# Patient Record
Sex: FEMALE | Race: BLACK OR AFRICAN AMERICAN | NOT HISPANIC OR LATINO | Employment: UNEMPLOYED | URBAN - METROPOLITAN AREA
[De-identification: names, ages, dates, MRNs, and addresses within clinical notes are randomized per-mention and may not be internally consistent; named-entity substitution may affect disease eponyms.]

---

## 2019-05-14 ENCOUNTER — HOSPITAL ENCOUNTER (EMERGENCY)
Facility: CLINIC | Age: 29
Discharge: HOME | End: 2019-05-14
Attending: EMERGENCY MEDICINE | Admitting: EMERGENCY MEDICINE
Payer: COMMERCIAL

## 2019-05-14 VITALS
HEART RATE: 91 BPM | TEMPERATURE: 98.6 F | HEIGHT: 64 IN | BODY MASS INDEX: 21.51 KG/M2 | WEIGHT: 126 LBS | RESPIRATION RATE: 18 BRPM | OXYGEN SATURATION: 99 % | SYSTOLIC BLOOD PRESSURE: 109 MMHG | DIASTOLIC BLOOD PRESSURE: 59 MMHG

## 2019-05-14 DIAGNOSIS — I49.3 PVC'S (PREMATURE VENTRICULAR CONTRACTIONS): ICD-10-CM

## 2019-05-14 PROCEDURE — 99283 EMERGENCY DEPT VISIT LOW MDM: CPT

## 2019-05-14 PROCEDURE — 93005 ELECTROCARDIOGRAM TRACING: CPT

## 2019-05-14 RX ORDER — METOPROLOL TARTRATE 25 MG/1
25 TABLET, FILM COATED ORAL 2 TIMES DAILY
Qty: 60 TABLET | Refills: 0 | Status: SHIPPED | OUTPATIENT
Start: 2019-05-14 | End: 2022-07-07

## 2019-05-14 ASSESSMENT — MIFFLIN-ST. JEOR: SCORE: 1281.53

## 2019-05-14 ASSESSMENT — ENCOUNTER SYMPTOMS
PALPITATIONS: 1
SHORTNESS OF BREATH: 0
COUGH: 1

## 2019-05-14 NOTE — ED AVS SNAPSHOT
Emergency Department  [address] [place] [address]  Phone:  [phone]  Fax:  [phone]                                    Laurie De Paz   MRN: [id number]    Department:   Emergency Department   Date of Visit:  5/14/2019           After Visit Summary Signature Page    I have received my discharge instructions, and my questions have been answered. I have discussed any challenges I see with this plan with the nurse or doctor.    ..........................................................................................................................................  Patient/Patient Representative Signature      ..........................................................................................................................................  Patient Representative Print Name and Relationship to Patient    ..................................................               ................................................  Date                                   Time    ..........................................................................................................................................  Reviewed by Signature/Title    ...................................................              ..............................................  Date                                               Time          22EPIC Rev 08/18

## 2019-05-15 NOTE — ED TRIAGE NOTES
"Feeling heart beats are irregular, feeling racing heart beats comes and goes with \"heart stopping\" sensation for one month,  Been worsening since yesterday. Coughing after heart racing stops.   "

## 2019-05-15 NOTE — ED PROVIDER NOTES
"  History   Chief Complaint:  Irregular heart beat    HPI   Laurie De Paz is a 29 year old female, who presents to the ED for evaluation of irregular heart beat. The patient reports feeling as if her heart will skip or pause and then resolve shortly after starting last night. She notes that she was fine all day today, but at 1600 she states she has been having consistent pauses or skips and has happened more than 10 times. The patient states the irregular beat is worse when she is sitting or lying down. The patient does state she drinks coffee, but has not had an excessive amount in the recent past. The patient denies any chest pain, leg swelling, shortness of breath, or any other acute symptoms or injuries.     CARDIAC RISK FACTORS:  Sex:    Female  Tobacco:   Yes  Hypertension:   No  Hyperlipidemia:  No  Diabetes:   No  Family History:  No    PE/DVT RISK FACTORS:  Sex:    Female  Hormones:   No  Tobacco:   Yes  Cancer:   No  Travel:   No  Surgery:   No  Other immobilization: No  Personal history:  No  Family history:  No    Allergies:  No known drug allergies    Medications:    The patient is not currently taking any prescribed medications.    Past Medical History:    History reviewed.  No pertinent past medical history.    Past Surgical History:    GYN surgery    Family History:    History reviewed. No pertinent family history.     Social History:  Smoking status: Yes  Alcohol use: Yes  Drug: No  Marital Status:  Single [1]    Review of Systems   Respiratory: Positive for cough. Negative for shortness of breath.    Cardiovascular: Positive for palpitations. Negative for chest pain and leg swelling.   All other systems reviewed and are negative.      Physical Exam     Patient Vitals for the past 24 hrs:   BP Temp Temp src Pulse Resp SpO2 Height Weight   05/14/19 1932 109/59 98.6  F (37  C) Oral 91 18 99 % 1.626 m (5' 4\") 57.2 kg (126 lb)     Physical Exam  Eye:  Pupils are equal, round, and reactive.  " Extraocular movements intact.    ENT:  No rhinorrhea.  Moist mucus membranes.  Normal tongue and tonsil.    Cardiac:  Regular rate and rhythm.  No murmurs, gallops, or rubs.    Pulmonary:  Clear to auscultation bilaterally.  No wheezes, rales, or rhonchi.    Abdomen:  Positive bowel sounds.  Abdomen is soft and non-distended, without focal tenderness.    Musculoskeletal:  Normal movement of all extremities without evidence for deficit. No lower extremity edema or asymmetry.     Skin:  Warm and dry without rashes.    Neurologic:  Non-focal exam without asymmetric weakness or numbness.     Psychiatric:  Normal affect with appropriate interaction with examiner.    Emergency Department Course   ECG (19:30:40):  Rate 90 bpm. PA interval 132. QRS duration 92. QT/QTc 366/447. P-R-T axes 73 56 51. Normal sinus rhythm with sinus arrhythmia. Normal ECG Interpreted at 1950 by Trierweiler, Chad A, MD.    Emergency Department Course:  Past medical records, nursing notes, and vitals reviewed.  1950: I performed an exam of the patient and obtained history, as documented above.  IV inserted and blood drawn.    Findings and plan explained to the Patient. Patient discharged home with instructions regarding supportive care, medications, and reasons to return. The importance of close follow-up was reviewed.     Impression & Plan    Medical Decision Making:  This delightful and healthy 29-year-old presents to us with ongoing issues of having intermittent palpitations in her chest.  She has been evaluated for this in the past with no real serious pathology found.  However, symptoms seem to be worse over the last couple of days.  She denies any significant pain, but notes that she feels pauses and skips in her chest that have occurred at least 10 times today.  This typically occurs when she is at rest or lying down.  Denies any difficulty with exertion.    Patient's exam is unremarkable.  During my conversations with her, she is on the  monitor and shows PVCs that corresponded with her having the symptoms.  She is undergone thorough work-up for this in the past.  She appears well-hydrated and notes that she has been eating and drinking normally.  I do not believe that she would require a laboratory investigation at this time considering the chronicity of this issue and prior work-ups.    We did discuss simply learning to live with these issues versus starting medications.  She prefers a trial of metoprolol for the next month which I feel is reasonable.  I gave her information to follow-up with her primary doctor and with cardiology.  Otherwise, she was invited back to our facility immediately for any constant discomfort, syncope, or any other emergent concerns.    Diagnosis:    ICD-10-CM    1. PVC's (premature ventricular contractions) I49.3      Disposition:  Discharged to home.    Discharge Medications:     Medication List      Started    metoprolol tartrate 25 MG tablet  Commonly known as:  LOPRESSOR  25 mg, Oral, 2 TIMES DAILY          Angel Verdguo  5/14/2019    EMERGENCY DEPARTMENT  Scribe Disclosure:  I, Angel Verdugo, am serving as a scribe at 7:50 PM on 5/14/2019 to document services personally performed by Trierweiler, Chad A, MD based on my observations and the provider's statements to me.       Trierweiler, Chad A, MD  05/16/19 9840

## 2020-01-26 ENCOUNTER — HOSPITAL ENCOUNTER (EMERGENCY)
Facility: CLINIC | Age: 30
Discharge: HOME | End: 2020-01-26
Attending: PHYSICIAN ASSISTANT | Admitting: PHYSICIAN ASSISTANT
Payer: COMMERCIAL

## 2020-01-26 ENCOUNTER — APPOINTMENT (OUTPATIENT)
Dept: GENERAL RADIOLOGY | Facility: CLINIC | Age: 30
End: 2020-01-26
Attending: EMERGENCY MEDICINE
Payer: COMMERCIAL

## 2020-01-26 VITALS
TEMPERATURE: 98.1 F | DIASTOLIC BLOOD PRESSURE: 59 MMHG | RESPIRATION RATE: 16 BRPM | WEIGHT: 176 LBS | SYSTOLIC BLOOD PRESSURE: 105 MMHG | HEART RATE: 80 BPM | BODY MASS INDEX: 30.05 KG/M2 | HEIGHT: 64 IN | OXYGEN SATURATION: 98 %

## 2020-01-26 DIAGNOSIS — S99.921A FOOT INJURY, RIGHT, INITIAL ENCOUNTER: ICD-10-CM

## 2020-01-26 PROCEDURE — 73630 X-RAY EXAM OF FOOT: CPT | Mod: RT

## 2020-01-26 PROCEDURE — 99284 EMERGENCY DEPT VISIT MOD MDM: CPT

## 2020-01-26 ASSESSMENT — ENCOUNTER SYMPTOMS
BACK PAIN: 0
ARTHRALGIAS: 0
NUMBNESS: 0
NECK PAIN: 0

## 2020-01-26 ASSESSMENT — MIFFLIN-ST. JEOR: SCORE: 1503.33

## 2020-01-26 NOTE — ED AVS SNAPSHOT
Emergency Department  [address] [facility] [address]  Phone:  [phone]  Fax:  [phone]                                    Laurie De Paz   MRN: [id number]    Department:   Emergency Department   Date of Visit:  1/26/2020           After Visit Summary Signature Page    I have received my discharge instructions, and my questions have been answered. I have discussed any challenges I see with this plan with the nurse or doctor.    ..........................................................................................................................................  Patient/Patient Representative Signature      ..........................................................................................................................................  Patient Representative Print Name and Relationship to Patient    ..................................................               ................................................  Date                                   Time    ..........................................................................................................................................  Reviewed by Signature/Title    ...................................................              ..............................................  Date                                               Time          22EPIC Rev 08/18

## 2020-01-27 NOTE — ED TRIAGE NOTES
Patient slipped and fell yesterday injuring her right foot. Top of right foot swollen and painful. Did not take anything for pain today.

## 2020-01-27 NOTE — ED PROVIDER NOTES
"  History   Chief Complaint:  Fall and Foot Pain    HPI   Laurie De Paz is a 30 year old female, who presents to the ED for evaluation after a mechanical fall and foot pain. The patient reports she was getting out of the car yesterday when she slipped on the ice and rolled her foot in a hyper plantar flexion mechanism causing her to fall. She denies hitting her head or sustaining any other injury. She notes her right foot is swollen and has the most pain on the top aspect of the foot. The patient denies any ankle pain. She has been able to ambulate since the event, but has difficulty putting weight on the foot. The patient has been taking Tylenol, but has not been able to relieve her pain. She denies numbness or tingling.    Allergies:  No known drug allergies    Medications:    The patient is not currently taking any prescribed medications.    Past Medical History:    History reviewed.  No pertinent past medical history.    Past Surgical History:    GYN surgery    Family History:    History reviewed. No pertinent family history.    Social History:  Smoking status: Yes  Alcohol use: Yes  Drug use: No  PCP: [facility]  Marital Status:  Single [1]    Review of Systems   Musculoskeletal: Negative for arthralgias, back pain and neck pain.        Foot pain   Neurological: Negative for numbness.   All other systems reviewed and are negative.    Physical Exam     Patient Vitals for the past 24 hrs:   BP Temp Temp src Pulse Heart Rate Resp SpO2 Height Weight   01/26/20 2053 105/59 98.1  F (36.7  C) Oral 80 80 16 98 % 1.626 m (5' 4\") 79.8 kg (176 lb)     Physical Exam  General: Resting comfortably.  Alert and oriented.   Head:  The scalp, face, and head appear normal   Eyes:  Conjunctivae and sclerae are normal    CV:  DP and PT pulses intact to right foot    Capillary refill is brisk in all digits of the right foot.   Resp:  No tachypnea. No respiratory distress    Normal effort  MS:  Tenderness to " the dorsal/proximal/lateral aspect of the right root. No ankle tenderness. ROM of the ankle is normal. Patient can wiggle toes without difficulty. No proximal fibular tenderness. Knee ROM is normal. Achilles is clinically intact.   Skin:  No ecchymosis. No obvious deformity. Swelling to the dorsal aspect of the right foot.  No lacerations or abrasions.  Neuro: Sensation is intact throughout right foot.     Emergency Department Course   Imaging:  Radiographic findings were communicated with the patient who voiced understanding of the findings.    XR Foot, 3 views, Right:  Normal joint spaces and alignment. No fracture.    Imaging independently reviewed and agree with radiologist interpretation.       Emergency Department Course:  Past medical records, nursing notes, and vitals reviewed.  2114: I performed an exam of the patient and obtained history, as documented above.  The patient was sent for a foot x-ray while in the emergency department, findings above.    Findings and plan explained to the Patient. Patient discharged home with instructions regarding supportive care, medications, and reasons to return. The importance of close follow-up was reviewed.     Impression & Plan    Medical Decision Making:  Laurie De Paz is a 30 year old female who presented for a right foot injury after slipping on ice as detailed as noted in there HPI. Concerned due to discomfort she presented for evaluation. CMS is intact. Xray was obtained and was negative with no evidence for fracture, dislocation, or malalignment. Given negative xray, this appears to be a simple contusion/sprain. I discussed the slight possibility of occult fracture not identified today and she expresses understanding. There is no evidence of neurovascular compromise. Overall, I think she is safe to be discharged home. Discussed use of ibuprofen/Tylenol and ice and elevation. she will follow up with PCP in 5-7 days for recheck. she will return immediately for  numbness, weakness, tingling, discoloration, or for other concerns. All questions were answered prior to discharge. The patient understands and agrees to this plan.    Diagnosis:    ICD-10-CM    1. Foot injury, right, initial encounter S99.921A      Disposition:  Discharged to home.    Angel Verdugo  1/26/2020    EMERGENCY DEPARTMENT  Scribe Disclosure:  I, Angel Verdugo, am serving as a scribe at 9:14 PM on 1/26/2020 to document services personally performed by Chanel Ibarra PA based on my observations and the provider's statements to me.       Chanel Ibarra PA-C  01/26/20 0665

## 2022-07-07 ENCOUNTER — OFFICE VISIT (OUTPATIENT)
Dept: URGENT CARE | Facility: URGENT CARE | Age: 32
End: 2022-07-07
Payer: COMMERCIAL

## 2022-07-07 VITALS
OXYGEN SATURATION: 98 % | RESPIRATION RATE: 21 BRPM | SYSTOLIC BLOOD PRESSURE: 106 MMHG | DIASTOLIC BLOOD PRESSURE: 73 MMHG | HEART RATE: 81 BPM | TEMPERATURE: 98.9 F

## 2022-07-07 DIAGNOSIS — Z77.21 ACCIDENTAL HYPODERMIC NEEDLESTICK INJURY WITH EXPOSURE TO BODY FLUID: Primary | ICD-10-CM
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
## 2022-07-07 PROCEDURE — 87389 HIV-1 AG W/HIV-1&-2 AB AG IA: CPT | Performed by: NURSE PRACTITIONER

## 2022-07-07 PROCEDURE — 36415 COLL VENOUS BLD VENIPUNCTURE: CPT | Performed by: NURSE PRACTITIONER

## 2022-07-07 PROCEDURE — 86706 HEP B SURFACE ANTIBODY: CPT | Performed by: NURSE PRACTITIONER

## 2022-07-07 PROCEDURE — 87340 HEPATITIS B SURFACE AG IA: CPT | Performed by: NURSE PRACTITIONER

## 2022-07-07 PROCEDURE — 99204 OFFICE O/P NEW MOD 45 MIN: CPT | Performed by: NURSE PRACTITIONER

## 2022-07-07 PROCEDURE — 86803 HEPATITIS C AB TEST: CPT | Performed by: NURSE PRACTITIONER

## 2022-07-07 RX ORDER — LETROZOLE 2.5 MG/1
2.5 TABLET, FILM COATED ORAL
COMMUNITY
Start: 2022-04-29 | End: 2023-04-29

## 2022-07-07 RX ORDER — CYCLOBENZAPRINE HCL 10 MG
TABLET ORAL
COMMUNITY
Start: 2022-04-19 | End: 2022-10-22

## 2022-07-07 NOTE — PROGRESS NOTES
Chief Complaint   Patient presents with     Urgent Care     Present for blood work. Reports she stuck herself with a client used needle today.          ICD-10-CM    1. Accidental hypodermic needlestick injury with exposure to body fluid  Z77.21     W46.0XXA    Await results of blood testing.  Counseled regarding likelihood of transmission and guidelines for future testing.  She understands she needs to follow-up with infectious disease immediately if tests are positive for consideration of prep.      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  Incident occurred earlier today.  Area was washed immediately with soap and water.      Objective    /73 (BP Location: Left arm, Patient Position: Sitting, Cuff Size: Adult Regular)   Pulse 81   Temp 98.9  F (37.2  C) (Tympanic)   Resp 21   SpO2 98%   Nurses notes and VS have been reviewed.    Physical Exam       GENERAL APPEARANCE: healthy appearing, alert     MS: extremities normal- no gross deformities noted; normal muscle tone.     SKIN: no suspicious lesions or rashes     NEURO: Normal strength and tone, mentation intact and speech normal     PSYCH: normal thought process; no significant mood disturbance    Patient Instructions   If any of Michela's tests come back positive get in to see infectious disease doctor as soon as possible.      CRISTÓBAL Godfrey, [facility] Urgent Care Provider    The use of Dragon/Offerboard dictation services may have been used to construct the content in this note; any grammatical or spelling errors are non-intentional. Please contact the author of this note directly if you are in need of any clarification.

## 2022-07-07 NOTE — PATIENT INSTRUCTIONS
If any of Michela's tests come back positive get in to see infectious disease doctor as soon as possible.

## 2022-07-08 LAB
HBV SURFACE AB SERPL IA-ACNC: 85.84 M[IU]/ML
HBV SURFACE AG SERPL QL IA: NONREACTIVE
HCV AB SERPL QL IA: NONREACTIVE
HIV 1+2 AB+HIV1 P24 AG SERPL QL IA: NONREACTIVE

## 2022-10-21 PROCEDURE — 99285 EMERGENCY DEPT VISIT HI MDM: CPT | Mod: 25

## 2022-10-22 ENCOUNTER — APPOINTMENT (OUTPATIENT)
Dept: CT IMAGING | Facility: CLINIC | Age: 32
End: 2022-10-22
Attending: EMERGENCY MEDICINE
Payer: COMMERCIAL

## 2022-10-22 ENCOUNTER — HOSPITAL ENCOUNTER (EMERGENCY)
Facility: CLINIC | Age: 32
Discharge: HOME | End: 2022-10-22
Attending: EMERGENCY MEDICINE | Admitting: EMERGENCY MEDICINE
Payer: COMMERCIAL

## 2022-10-22 ENCOUNTER — APPOINTMENT (OUTPATIENT)
Dept: ULTRASOUND IMAGING | Facility: CLINIC | Age: 32
End: 2022-10-22
Attending: EMERGENCY MEDICINE
Payer: COMMERCIAL

## 2022-10-22 VITALS
HEART RATE: 87 BPM | DIASTOLIC BLOOD PRESSURE: 67 MMHG | OXYGEN SATURATION: 97 % | TEMPERATURE: 98.4 F | SYSTOLIC BLOOD PRESSURE: 102 MMHG | RESPIRATION RATE: 16 BRPM

## 2022-10-22 DIAGNOSIS — R10.9 RIGHT FLANK PAIN: ICD-10-CM

## 2022-10-22 DIAGNOSIS — K82.4 GALLBLADDER POLYP: ICD-10-CM

## 2022-10-22 LAB
ALBUMIN SERPL-MCNC: 3.4 G/DL (ref 3.4–5)
ALBUMIN UR-MCNC: NEGATIVE MG/DL
ALP SERPL-CCNC: 73 U/L (ref 40–150)
ALT SERPL W P-5'-P-CCNC: 18 U/L (ref 0–50)
ANION GAP SERPL CALCULATED.3IONS-SCNC: 8 MMOL/L (ref 3–14)
APPEARANCE UR: ABNORMAL
AST SERPL W P-5'-P-CCNC: 18 U/L (ref 0–45)
BACTERIA #/AREA URNS HPF: ABNORMAL /HPF
BASOPHILS # BLD AUTO: 0 10E3/UL (ref 0–0.2)
BASOPHILS NFR BLD AUTO: 0 %
BILIRUB DIRECT SERPL-MCNC: 0.1 MG/DL (ref 0–0.2)
BILIRUB SERPL-MCNC: 0.5 MG/DL (ref 0.2–1.3)
BILIRUB UR QL STRIP: NEGATIVE
BUN SERPL-MCNC: 8 MG/DL (ref 7–30)
CALCIUM SERPL-MCNC: 9 MG/DL (ref 8.5–10.1)
CHLORIDE BLD-SCNC: 105 MMOL/L (ref 94–109)
CO2 SERPL-SCNC: 25 MMOL/L (ref 20–32)
COLOR UR AUTO: ABNORMAL
CREAT SERPL-MCNC: 0.45 MG/DL (ref 0.52–1.04)
EOSINOPHIL # BLD AUTO: 0.2 10E3/UL (ref 0–0.7)
EOSINOPHIL NFR BLD AUTO: 2 %
ERYTHROCYTE [DISTWIDTH] IN BLOOD BY AUTOMATED COUNT: 12.6 % (ref 10–15)
GFR SERPL CREATININE-BSD FRML MDRD: >90 ML/MIN/1.73M2
GLUCOSE BLD-MCNC: 88 MG/DL (ref 70–99)
GLUCOSE UR STRIP-MCNC: NEGATIVE MG/DL
HCG UR QL: NEGATIVE
HCT VFR BLD AUTO: 41.9 % (ref 35–47)
HGB BLD-MCNC: 14.1 G/DL (ref 11.7–15.7)
HGB UR QL STRIP: ABNORMAL
HOLD SPECIMEN: NORMAL
HOLD SPECIMEN: NORMAL
IMM GRANULOCYTES # BLD: 0.1 10E3/UL
IMM GRANULOCYTES NFR BLD: 1 %
KETONES UR STRIP-MCNC: NEGATIVE MG/DL
LEUKOCYTE ESTERASE UR QL STRIP: ABNORMAL
LIPASE SERPL-CCNC: 139 U/L (ref 73–393)
LYMPHOCYTES # BLD AUTO: 2.8 10E3/UL (ref 0.8–5.3)
LYMPHOCYTES NFR BLD AUTO: 24 %
MCH RBC QN AUTO: 29.8 PG (ref 26.5–33)
MCHC RBC AUTO-ENTMCNC: 33.7 G/DL (ref 31.5–36.5)
MCV RBC AUTO: 89 FL (ref 78–100)
MONOCYTES # BLD AUTO: 0.7 10E3/UL (ref 0–1.3)
MONOCYTES NFR BLD AUTO: 6 %
MUCOUS THREADS #/AREA URNS LPF: PRESENT /LPF
NEUTROPHILS # BLD AUTO: 8.1 10E3/UL (ref 1.6–8.3)
NEUTROPHILS NFR BLD AUTO: 67 %
NITRATE UR QL: NEGATIVE
NRBC # BLD AUTO: 0 10E3/UL
NRBC BLD AUTO-RTO: 0 /100
PH UR STRIP: 5.5 [PH] (ref 5–7)
PLATELET # BLD AUTO: 243 10E3/UL (ref 150–450)
POTASSIUM BLD-SCNC: 3.6 MMOL/L (ref 3.4–5.3)
PROT SERPL-MCNC: 7.5 G/DL (ref 6.8–8.8)
RBC # BLD AUTO: 4.73 10E6/UL (ref 3.8–5.2)
RBC URINE: 2 /HPF
SODIUM SERPL-SCNC: 138 MMOL/L (ref 133–144)
SP GR UR STRIP: 1.02 (ref 1–1.03)
SQUAMOUS EPITHELIAL: 3 /HPF
UROBILINOGEN UR STRIP-MCNC: NORMAL MG/DL
WBC # BLD AUTO: 11.9 10E3/UL (ref 4–11)
WBC URINE: 4 /HPF

## 2022-10-22 PROCEDURE — 36415 COLL VENOUS BLD VENIPUNCTURE: CPT | Performed by: EMERGENCY MEDICINE

## 2022-10-22 PROCEDURE — 96375 TX/PRO/DX INJ NEW DRUG ADDON: CPT

## 2022-10-22 PROCEDURE — 96361 HYDRATE IV INFUSION ADD-ON: CPT

## 2022-10-22 PROCEDURE — 96374 THER/PROPH/DIAG INJ IV PUSH: CPT | Mod: 59

## 2022-10-22 PROCEDURE — 250N000011 HC RX IP 250 OP 636: Performed by: EMERGENCY MEDICINE

## 2022-10-22 PROCEDURE — 87086 URINE CULTURE/COLONY COUNT: CPT | Performed by: EMERGENCY MEDICINE

## 2022-10-22 PROCEDURE — 74177 CT ABD & PELVIS W/CONTRAST: CPT

## 2022-10-22 PROCEDURE — 83690 ASSAY OF LIPASE: CPT | Performed by: EMERGENCY MEDICINE

## 2022-10-22 PROCEDURE — 258N000003 HC RX IP 258 OP 636: Performed by: EMERGENCY MEDICINE

## 2022-10-22 PROCEDURE — 85025 COMPLETE CBC W/AUTO DIFF WBC: CPT | Performed by: EMERGENCY MEDICINE

## 2022-10-22 PROCEDURE — 81001 URINALYSIS AUTO W/SCOPE: CPT | Performed by: EMERGENCY MEDICINE

## 2022-10-22 PROCEDURE — 82310 ASSAY OF CALCIUM: CPT | Performed by: EMERGENCY MEDICINE

## 2022-10-22 PROCEDURE — 82248 BILIRUBIN DIRECT: CPT | Performed by: EMERGENCY MEDICINE

## 2022-10-22 PROCEDURE — 76830 TRANSVAGINAL US NON-OB: CPT

## 2022-10-22 PROCEDURE — 80048 BASIC METABOLIC PNL TOTAL CA: CPT | Performed by: EMERGENCY MEDICINE

## 2022-10-22 PROCEDURE — 81025 URINE PREGNANCY TEST: CPT | Performed by: EMERGENCY MEDICINE

## 2022-10-22 PROCEDURE — 250N000013 HC RX MED GY IP 250 OP 250 PS 637: Performed by: EMERGENCY MEDICINE

## 2022-10-22 PROCEDURE — 250N000009 HC RX 250: Performed by: EMERGENCY MEDICINE

## 2022-10-22 RX ORDER — LIDOCAINE 4 G/G
1 PATCH TOPICAL ONCE
Status: DISCONTINUED | OUTPATIENT
Start: 2022-10-22 | End: 2022-10-22 | Stop reason: HOSPADM

## 2022-10-22 RX ORDER — IBUPROFEN 600 MG/1
600 TABLET, FILM COATED ORAL EVERY 6 HOURS PRN
Qty: 60 TABLET | Refills: 0 | Status: SHIPPED | OUTPATIENT
Start: 2022-10-22

## 2022-10-22 RX ORDER — IOPAMIDOL 755 MG/ML
89 INJECTION, SOLUTION INTRAVASCULAR ONCE
Status: COMPLETED | OUTPATIENT
Start: 2022-10-22 | End: 2022-10-22

## 2022-10-22 RX ORDER — HYDROMORPHONE HYDROCHLORIDE 1 MG/ML
0.5 INJECTION, SOLUTION INTRAMUSCULAR; INTRAVENOUS; SUBCUTANEOUS
Status: DISCONTINUED | OUTPATIENT
Start: 2022-10-22 | End: 2022-10-22 | Stop reason: HOSPADM

## 2022-10-22 RX ORDER — ONDANSETRON 2 MG/ML
4 INJECTION INTRAMUSCULAR; INTRAVENOUS ONCE
Status: COMPLETED | OUTPATIENT
Start: 2022-10-22 | End: 2022-10-22

## 2022-10-22 RX ORDER — KETOROLAC TROMETHAMINE 15 MG/ML
15 INJECTION, SOLUTION INTRAMUSCULAR; INTRAVENOUS ONCE
Status: COMPLETED | OUTPATIENT
Start: 2022-10-22 | End: 2022-10-22

## 2022-10-22 RX ORDER — LIDOCAINE 4 G/G
1 PATCH TOPICAL EVERY 24 HOURS
Qty: 10 PATCH | Refills: 0 | Status: SHIPPED | OUTPATIENT
Start: 2022-10-22 | End: 2022-12-11

## 2022-10-22 RX ORDER — CYCLOBENZAPRINE HCL 10 MG
10 TABLET ORAL 3 TIMES DAILY PRN
Qty: 20 TABLET | Refills: 0 | Status: SHIPPED | OUTPATIENT
Start: 2022-10-22 | End: 2022-10-28

## 2022-10-22 RX ADMIN — KETOROLAC TROMETHAMINE 15 MG: 15 INJECTION, SOLUTION INTRAMUSCULAR; INTRAVENOUS at 06:35

## 2022-10-22 RX ADMIN — SODIUM CHLORIDE 1000 ML: 9 INJECTION, SOLUTION INTRAVENOUS at 06:31

## 2022-10-22 RX ADMIN — LIDOCAINE 1 PATCH: 560 PATCH PERCUTANEOUS; TOPICAL; TRANSDERMAL at 06:36

## 2022-10-22 RX ADMIN — IOPAMIDOL 89 ML: 755 INJECTION, SOLUTION INTRAVENOUS at 08:36

## 2022-10-22 RX ADMIN — HYDROMORPHONE HYDROCHLORIDE 0.5 MG: 1 INJECTION, SOLUTION INTRAMUSCULAR; INTRAVENOUS; SUBCUTANEOUS at 07:57

## 2022-10-22 RX ADMIN — SODIUM CHLORIDE 65 ML: 9 INJECTION, SOLUTION INTRAVENOUS at 08:37

## 2022-10-22 RX ADMIN — ONDANSETRON 4 MG: 2 INJECTION INTRAMUSCULAR; INTRAVENOUS at 06:33

## 2022-10-22 ASSESSMENT — ENCOUNTER SYMPTOMS
SHORTNESS OF BREATH: 1
VOMITING: 0
ABDOMINAL PAIN: 1
FLANK PAIN: 1
FEVER: 0
DYSURIA: 0
NAUSEA: 1

## 2022-10-22 ASSESSMENT — ACTIVITIES OF DAILY LIVING (ADL)
ADLS_ACUITY_SCORE: 35
ADLS_ACUITY_SCORE: 35

## 2022-10-22 NOTE — ED PROVIDER NOTES
History   Chief Complaint:  Flank Pain     The history is provided by the patient.      Laurie De Paz is a 32 year old female who presents with her son for flank pain. Patient reports that two days ago, she developed a sudden right-sided flank pain, which radiates down to her abdomen. She states it worsened tonight, which prompted her to come in. She endorses nausea and shortness of breath with the pain. And last took tylenol at 2300 last night. Here, her pain is a 10/10. She has never had this pain before. Nonetheless, patient denies having vomiting, dysuria, vaginal discharge, vaginal bleeding, chest pain, rashes, or fevers. Denies abdominal surgeries. Denies chance of currently being pregnancy.     Review of Systems   Constitutional: Negative for fever.   Respiratory: Positive for shortness of breath.    Cardiovascular: Negative for chest pain.   Gastrointestinal: Positive for abdominal pain and nausea. Negative for vomiting.   Genitourinary: Positive for flank pain. Negative for dysuria, vaginal bleeding and vaginal discharge.   Skin: Negative for rash.   All other systems reviewed and are negative.    Allergies:  The patient has no known allergies.     Medications:  cyclobenzaprine   letrozole     Past Medical History:     Acute gastritis  Cholesterolosis of gallbladder  Fourth degree perineal laceration  Cervical high risk HPV (human papillomavirus) test positive    Past Surgical History:    GYN surgery    Family History:    The patient denies past family history.     Social History:  The patient presents to the ED alone.  Tobacco Use: Former smoker  PCP: Clinic, [facility] [place]     Physical Exam     Patient Vitals for the past 24 hrs:   BP Temp Temp src Pulse Resp SpO2   10/22/22 0800 102/67 -- -- 87 -- 97 %   10/22/22 0538 106/69 -- -- 80 16 99 %   10/22/22 0104 135/79 98.4  F (36.9  C) Oral 84 16 99 %     Physical Exam  General: Alert, appears well-developed and well-nourished. Cooperative.      In mild distress  HEENT:  Head:  Atraumatic  Ears:  External ears are normal  Mouth/Throat:  Oropharynx is without erythema or exudate and mucous membranes are moist.   Eyes:   Conjunctivae normal and EOM are normal. No scleral icterus.  CV:  Normal rate, regular rhythm, normal heart sounds and radial pulses are 2+ and symmetric.  No murmur.  Resp:  Breath sounds are clear bilaterally    Non-labored, no retractions or accessory muscle use  GI:  Abdomen is soft, no distension, no tenderness. No rebound or guarding.  Right CVA tenderness  MS:  Normal range of motion. No edema.    Normal strength in all 4 extremities.     Back atraumatic.    No midline cervical, thoracic, or lumbar tenderness    Musculoskeletal pain to right flank to palpation.   Skin:  Warm and dry.  No rash or lesions noted.  Neuro: Alert. Normal strength.  GCS: 15  Psych:  Normal mood and affect.    Emergency Department Course     Imaging:  US Pelvis Cmplt w Transvag & Doppler LmtPel Duplex Limited   Final Result   IMPRESSION:     1.  Normal transabdominal pelvic ultrasound. No ovarian torsion.               CT Abdomen Pelvis w Contrast   Final Result   IMPRESSION:    1.  Mild asymmetric enlargement of the right ovary compared to the left. Consider a pelvic ultrasound for further assessment.   2.  Otherwise, no acute findings in the abdomen and pelvis.   3.  Small hyperdense focus along the gallbladder wall, possibly a small gallbladder polyp. A right upper quadrant ultrasound can be considered for confirmation on a nonemergent basis.        Report per radiology    Laboratory:  Labs Ordered and Resulted from Time of ED Arrival to Time of ED Departure   BASIC METABOLIC PANEL - Abnormal       Result Value    Sodium 138      Potassium 3.6      Chloride 105      Carbon Dioxide (CO2) 25      Anion Gap 8      Urea Nitrogen 8      Creatinine 0.45 (*)     Calcium 9.0      Glucose 88      GFR Estimate >90     ROUTINE UA WITH MICROSCOPIC REFLEX TO CULTURE  - Abnormal    Color Urine Light Yellow      Appearance Urine Slightly Cloudy (*)     Glucose Urine Negative      Bilirubin Urine Negative      Ketones Urine Negative      Specific Gravity Urine 1.017      Blood Urine Trace (*)     pH Urine 5.5      Protein Albumin Urine Negative      Urobilinogen Urine Normal      Nitrite Urine Negative      Leukocyte Esterase Urine Moderate (*)     Bacteria Urine Few (*)     Mucus Urine Present (*)     RBC Urine 2      WBC Urine 4      Squamous Epithelials Urine 3 (*)    CBC WITH PLATELETS AND DIFFERENTIAL - Abnormal    WBC Count 11.9 (*)     RBC Count 4.73      Hemoglobin 14.1      Hematocrit 41.9      MCV 89      MCH 29.8      MCHC 33.7      RDW 12.6      Platelet Count 243      % Neutrophils 67      % Lymphocytes 24      % Monocytes 6      % Eosinophils 2      % Basophils 0      % Immature Granulocytes 1      NRBCs per 100 WBC 0      Absolute Neutrophils 8.1      Absolute Lymphocytes 2.8      Absolute Monocytes 0.7      Absolute Eosinophils 0.2      Absolute Basophils 0.0      Absolute Immature Granulocytes 0.1      Absolute NRBCs 0.0     HCG QUALITATIVE URINE - Normal    hCG Urine Qualitative Negative     HEPATIC FUNCTION PANEL - Normal    Bilirubin Total 0.5      Bilirubin Direct 0.1      Protein Total 7.5      Albumin 3.4      Alkaline Phosphatase 73      AST 18      ALT 18     LIPASE - Normal    Lipase 139     URINE CULTURE      Emergency Department Course:     Reviewed:  I reviewed nursing notes, vitals, past medical history, Care Everywhere and [facility]    Assessments:  0620 I obtained history and examined the patient as noted above.   0910 I rechecked the patient and explained findings.     Interventions:  Medications   HYDROmorphone (PF) (DILAUDID) injection 0.5 mg (0.5 mg Intravenous Given 10/22/22 3752)   Lidocaine (LIDOCARE) 4 % Patch 1 patch (1 patch Transdermal Patch/Med Applied 10/22/22 0095)   lidocaine patch in PLACE (has no administration in time range)   0.9%  sodium chloride BOLUS (1,000 mLs Intravenous New Bag 10/22/22 0631)   ketorolac (TORADOL) injection 15 mg (15 mg Intravenous Given 10/22/22 0635)   ondansetron (ZOFRAN) injection 4 mg (4 mg Intravenous Given 10/22/22 0633)   iopamidol (ISOVUE-370) solution 89 mL (89 mLs Intravenous Given 10/22/22 0836)   Saline (65 mLs As instructed Given 10/22/22 0837)     Disposition:  The patient was discharged to home.     PERC Rule for risk stratifying PE to low risk (calculator)  Background  Risk stratifies patients to low risk of PE if all 8 criteria are present including age <50, heart rate <100, O2 Sat >94%, no unilateral leg edema, no hemoptysis, no recent surgery or trauma, no prior VTE, and no hormone use.  Data  32 year old  does not have a problem list on file.  @[email or web address]@   has a past surgical history that includes GYN surgery.  Pulse: 87  SpO2: 97 %  Criteria   Of  8 possible items (all criteria must be present):  Age <50 years  Heart rate <100 bpm  Oxygen Saturation >94%  No unilateral leg swelling  No hemoptysis  No surgery or trauma within 4 weeks  No prior DVT or PE  No hormone use (oral, transdermal and intravaginal estrogens)  Interpretation  All eight criteria are met AND low clinical PE suspicion: No further evaluation for PE required    Impression & Plan     CMS Diagnoses: None.    Medical Decision Making:  Laurie De Paz is a 32 year old female who presents with right flank pain.  Associated symptoms include right lower abd pain.  Pain is worse with movement.  A broad differential diagnosis was considered including diverticulitis, ureterolithiasis, tumor, colitis, cholecystitis, aneurysm, dissection, hydronephrosis, pneumonia, rib fracture, UTI, pyelonephritis amongst many other etiologies.  I doubt PE given no chest pain, normal vital signs, and PERC negative.    The workup in the ED is at this point negative.  No etiology for the patients pain is found at this point and my suspicion of an intraabdominal  or intrathoracic catastrophe or other worrisome etiology is very low. Incidental possible gallbladder polyp, communicated to patient at bedside.  This could be further worked up with outpatient US in near future.  Very unlikely to be the cause of the patient's symptoms.  Normal LFTs and bilirubin.  CT did show a larger right ovary versus left and so we did follow with an ultrasound of the pelvis.  Thankfully no evidence of torsion or cyst.  I will not therefore admit for serial exams and further workup.  Patient is hemodynamically stable in ED. Plan is home with flank pain recheck by primary care physician or return to ED at that time.  Patient was questioning about opiate pain medication for her discomfort.  I think at this time given minimal trial of over-the-counter medications at home I would encourage her to use ibuprofen every 6 hours, Flexeril every 8 hours, and lidocaine patches as needed.  I discussed my desire to stay away from opiate pain medications as there is no clear evidence of a surgical or infectious process requiring these medications at this time.  I did encourage her to follow-up with her primary care provider in 3 to 5 days for recheck if the pain changes in her pain does not seem to be improved with the above prescribed regimen.  Return for fevers greater than 102, increasing pain, other new symptoms develop.  Flank pain handout given.  Questions were answered.     Diagnosis:    ICD-10-CM    1. Right flank pain  R10.9       2. Gallbladder polyp  K82.4         Discharge Medications:  New Prescriptions    CYCLOBENZAPRINE (FLEXERIL) 10 MG TABLET    Take 1 tablet (10 mg) by mouth 3 times daily as needed for muscle spasms    IBUPROFEN (ADVIL/MOTRIN) 600 MG TABLET    Take 1 tablet (600 mg) by mouth every 6 hours as needed for moderate pain    LIDOCAINE (LIDOCARE) 4 % PATCH    Place 1 patch onto the skin every 24 hours To prevent lidocaine toxicity, patient should be patch free for 12 hrs daily.      Scribe Disclosure:  I, Genesis Foley, am serving as a scribe at 6:26 AM on 10/22/2022 to document services personally performed by Tariq Mays MD based on my observations and the provider's statements to me.       Tariq Mays MD  10/22/22 1122

## 2022-10-22 NOTE — ED NOTES
Pt Reports pain 10/10 Rt flank. No trauma noted.  MD notified. Pt  informed need urine for pregnant Test. up to BR with minimal voided.

## 2022-10-23 LAB — BACTERIA UR CULT: NO GROWTH

## 2022-12-11 ENCOUNTER — HOSPITAL ENCOUNTER (EMERGENCY)
Facility: CLINIC | Age: 32
Discharge: HOME | End: 2022-12-11
Attending: EMERGENCY MEDICINE | Admitting: EMERGENCY MEDICINE
Payer: COMMERCIAL

## 2022-12-11 VITALS
OXYGEN SATURATION: 98 % | SYSTOLIC BLOOD PRESSURE: 110 MMHG | DIASTOLIC BLOOD PRESSURE: 64 MMHG | RESPIRATION RATE: 12 BRPM | TEMPERATURE: 97.6 F | HEART RATE: 71 BPM

## 2022-12-11 DIAGNOSIS — M54.6 ACUTE LEFT-SIDED THORACIC BACK PAIN: ICD-10-CM

## 2022-12-11 PROCEDURE — 99284 EMERGENCY DEPT VISIT MOD MDM: CPT

## 2022-12-11 RX ORDER — LIDOCAINE 50 MG/G
1 PATCH TOPICAL EVERY 24 HOURS
Qty: 5 PATCH | Refills: 0 | Status: SHIPPED | OUTPATIENT
Start: 2022-12-11 | End: 2022-12-16

## 2022-12-11 RX ORDER — METHOCARBAMOL 500 MG/1
500 TABLET, FILM COATED ORAL 4 TIMES DAILY PRN
Qty: 12 TABLET | Refills: 0 | Status: SHIPPED | OUTPATIENT
Start: 2022-12-11 | End: 2022-12-15

## 2022-12-11 ASSESSMENT — ENCOUNTER SYMPTOMS
SHORTNESS OF BREATH: 0
BACK PAIN: 1

## 2022-12-11 NOTE — DISCHARGE INSTRUCTIONS
I recommend altering dose of Tylenol, ibuprofen, gentle massage and heat pack.  You can apply the Lidoderm patches to your sore areas.  You can try the Robaxin to see if this helps.  You should return should he develop chest pain, chest pressure trouble breathing or pain especially worse with exertion, going up and down stairs or consciousness/sweating.

## 2022-12-11 NOTE — ED PROVIDER NOTES
History   Chief Complaint:  Back Pain (Pt c/o upper back pain for 5 days.)       ISAMAR De Paz is a 32 year old female who presents with back pain that started a week ago. She states the back pain had a sudden onset, with no trigger. She states that the back pain is exacerbated when standing up or turning her head. The patient states her back pain started in the lower back, but has started radiating to her upper back. She reports taking 600 mg Ibuprofen, but this has not alleviated her back pain. She denies chest pain, chest pressure, and shortness of breath. Of note, the patient states she spends a lot of time on her computer for work.    Review of Systems   Respiratory: Negative for shortness of breath.    Cardiovascular: Negative for chest pain.   Musculoskeletal: Positive for back pain.   All other systems reviewed and are negative.    Allergies:  The patient has no known allergies.     Medications:  Femara  Reglan    Past Medical History:     Cholesterolosis of gallbladder  Amblyopia    Past Surgical History:    Therapeutic      Social History:  The patient presents to the ED alone.  The patient arrived by private vehicle.  PCP: [facility], [facility]     Physical Exam     Patient Vitals for the past 24 hrs:   BP Temp Temp src Pulse Resp SpO2   22 1116 110/64 97.6  F (36.4  C) Temporal 71 12 98 %       Physical Exam  Constitutional: Alert, attentive, GCS 15   Eyes: EOM are normal, anicteric, conjugate gaze  CV: distal extremities warm, well perfused  Chest: Non-labored breathing on RA  MSK: Focal reproducible left posterior thoracic lumbar paraspinal muscular tenderness, especially under scapula  Neurological: Alert, attentive, moving all extremities equally.   Skin: Skin is warm and dry.        Emergency Department Course       Emergency Department Course:    Reviewed:  I reviewed nursing notes, vitals and past medical history    Assessments:  1130 I obtained history and  examined the patient as noted above.     Disposition:  The patient was discharged to home.     Impression & Plan     Medical Decision Makin-year-old woman without significant past medical history presenting for reproducible left posterior scapular/thoracic paraspinal muscular tenderness.  She reports worsening pain with movement, no change with inspiration, no noted weakness to the hand.  I do not suspect cardiac or pulmonary etiology.  Given his full reproducibility high suspicion for musculoskeletal etiology, I recommended Lidoderm patch, Tylenol, ibuprofen, heat pack and gentle massage and a short course of Robaxin.  She had previously not tolerated Flexeril.  Return precautions reviewed and she was discharged home.    Diagnosis:    ICD-10-CM    1. Acute left-sided thoracic back pain  M54.6           Discharge Medications:  Discharge Medication List as of 2022 12:33 PM      START taking these medications    Details   lidocaine (LIDODERM) 5 % patch Place 1 patch onto the skin every 24 hours for 5 days To prevent lidocaine toxicity, patient should be patch free for 12 hrs daily.Disp-5 patch, R-0Local Print      methocarbamol (ROBAXIN) 500 MG tablet Take 1 tablet (500 mg) by mouth 4 times daily as needed for muscle spasms, Disp-12 tablet, R-0, Local Print           Mendez Orourke MD  [employer]  1:02 PM 22       Scribe Disclosure:  I, Isaiah Santacruz, am serving as a scribe at 11:30 AM on 2022 to document services personally performed by Mendez Orourke MD  based on my observations and the provider's statements to me.             Mendez Orourke MD  22 [id number]